# Patient Record
Sex: FEMALE | Race: WHITE | NOT HISPANIC OR LATINO | URBAN - METROPOLITAN AREA
[De-identification: names, ages, dates, MRNs, and addresses within clinical notes are randomized per-mention and may not be internally consistent; named-entity substitution may affect disease eponyms.]

---

## 2019-10-04 ENCOUNTER — EMERGENCY (EMERGENCY)
Facility: HOSPITAL | Age: 39
LOS: 1 days | Discharge: HOME | End: 2019-10-04
Attending: EMERGENCY MEDICINE
Payer: COMMERCIAL

## 2019-10-04 VITALS
TEMPERATURE: 98 F | RESPIRATION RATE: 17 BRPM | OXYGEN SATURATION: 96 % | WEIGHT: 194.01 LBS | SYSTOLIC BLOOD PRESSURE: 124 MMHG | HEART RATE: 72 BPM | DIASTOLIC BLOOD PRESSURE: 88 MMHG

## 2019-10-04 PROCEDURE — 73700 CT LOWER EXTREMITY W/O DYE: CPT | Mod: 26,RT

## 2019-10-04 PROCEDURE — 73700 CT LOWER EXTREMITY W/O DYE: CPT

## 2019-10-04 PROCEDURE — 73610 X-RAY EXAM OF ANKLE: CPT

## 2019-10-04 PROCEDURE — 99284 EMERGENCY DEPT VISIT MOD MDM: CPT

## 2019-10-04 PROCEDURE — 73600 X-RAY EXAM OF ANKLE: CPT

## 2019-10-04 PROCEDURE — 99285 EMERGENCY DEPT VISIT HI MDM: CPT | Mod: 25

## 2019-10-04 PROCEDURE — 73590 X-RAY EXAM OF LOWER LEG: CPT | Mod: 26,RT

## 2019-10-04 PROCEDURE — 76377 3D RENDER W/INTRP POSTPROCES: CPT

## 2019-10-04 PROCEDURE — 76377 3D RENDER W/INTRP POSTPROCES: CPT | Mod: 26

## 2019-10-04 PROCEDURE — 73590 X-RAY EXAM OF LOWER LEG: CPT

## 2019-10-04 PROCEDURE — 27762 CLTX MED ANKLE FX W/MNPJ: CPT

## 2019-10-04 PROCEDURE — 73610 X-RAY EXAM OF ANKLE: CPT | Mod: 26,RT

## 2019-10-04 NOTE — ED PROVIDER NOTE - CLINICAL SUMMARY MEDICAL DECISION MAKING FREE TEXT BOX
R proximal fib and ankle fracture s/p fall. NV intact. In splint already, will keep in splint until ortho eval. Consult ortho. Re fall last week, sx c/w post-concussive sx, improved, neuro exam wnl, extremely low suspicion of bleed or other sig intracranial traumatic injury. Pt does not want analgesia at this time. Re-eval. - Harvey Underwood MD

## 2019-10-04 NOTE — ED PROVIDER NOTE - CARE PLAN
Principal Discharge DX:	Closed fracture of proximal end of right fibula, unspecified fracture morphology, initial encounter

## 2019-10-04 NOTE — ED ADULT NURSE NOTE - OBJECTIVE STATEMENT
38 yr old fell off a tree and landed on her ankle. on assessment a and o x 3 lungs clear abd soft non tender no swelling in extremities no n/v/d no9 fevers no other complaints or medical issues. leg was wrapped at urgent care .

## 2019-10-04 NOTE — ED PROVIDER NOTE - NSFOLLOWUPINSTRUCTIONS_ED_ALL_ED_FT
Please follow up with the orthopedic surgeon, Dr. Askew, on Monday. Please call first thing tomorrow morning for an appointment.   Please use crutches. Please follow up with Dr. Askew [phone] within 5 days. Dr. Askew has an available appointment this Monday, 10/7/19. Please call first thing tomorrow morning to make an appointment.   Please take Tylenol and Motrin as needed for pain.  Please do not bear weight on your right leg. Crutches were provided for you.  Please return to the emergency department should you develop worsening symptoms, including but not limited to worsening of your pain, numbness, weakness, fever, chest pain and/or difficulty breathing.

## 2019-10-04 NOTE — ED PROVIDER NOTE - NS ED ROS FT
Constitutional: No fever or chills  Eyes: No visual changes  HEENT: No throat pain  CV: No chest pain  Resp: No SOB no cough  GI: No abd pain, nausea or vomiting  : No dysuria  MSK: RLE ankle pain  Skin: No rash  Neuro: No headache

## 2019-10-04 NOTE — CONSULT NOTE ADULT - SUBJECTIVE AND OBJECTIVE BOX
38y Female community ambulator presents c/o R ankle pain s/p fall. Pt is a community ambulatory at baseline. Pt denies numbness tingling paresthesias in affected limb. Pt denies headstrike or LOC and denies any other orthopedic injuries at this time.     Pt was playing with children, was swinging on a rope swing, lost  and fell onto R ankle. Unable to ambulate on after injury 2/2 pain. Seen at urgent care, who obtained X-rays, noted a proximal fibula and medial mal fracture, and sent to ED for further eval and treatment.   PAST MEDICAL & SURGICAL HISTORY:    MEDICATIONS  (STANDING):    Allergies    No Known Allergies    Intolerances    Vital Signs Last 24 Hrs  T(C): 36.9 (10-04-19 @ 16:52), Max: 36.9 (10-04-19 @ 16:52)  T(F): 98.4 (10-04-19 @ 16:52), Max: 98.4 (10-04-19 @ 16:52)  HR: 72 (10-04-19 @ 16:52) (72 - 72)  BP: 124/88 (10-04-19 @ 16:52) (124/88 - 124/88)  BP(mean): --  RR: 17 (10-04-19 @ 16:52) (17 - 17)  SpO2: 96% (10-04-19 @ 16:52) (96% - 96%)    Imaging: XR shows right medial mal fracture and proximal fibula    Physical Exam  Gen: NAD, AAOx3  RLE: Skin intact, +swelling at ankle, +TTP over medial malleoli and proximal fibula, no bony TTP at medial mal/Knee/Foot/Toes, able to SLR, neg logroll, +EHL/FHL/TA/GS, SILT L3-S1, +DP/PT Pulses, compartments soft, no calf TTP B/L    Secondary Survey: Full ROM of unaffected extremities, SILT globally, compartments soft, no bony TTP over bony prominences, no calf TTP, able to SLR with contralateral leg, no TTP along axial spine    Procedure: Reduction performed. Pt placed in well padded trilam splint. NVI postt splint.    A/P: 38y Female with R Maisonneuve type Fracture with medial mal  -pain control  -keep splint clean dry intact  -rest ice elevate affected ankle  -NWB on affected ankle  -NVI post splint  -Post reduction XR reveal adequate reduction  -discussed signs symptoms of compartment syndrome  -discussed need for surgical fixation  -please follow up in office within 5 days of DC from ED with Dr. Askew [phone] (available on 10/7 for appointment)  -ortho stable for DC 38y Female community ambulator presents c/o R ankle pain s/p fall. Pt is a community ambulatory at baseline. Pt denies numbness tingling paresthesias in affected limb. Pt denies headstrike or LOC and denies any other orthopedic injuries at this time.     Pt was playing with children, was swinging on a rope swing, lost  and fell onto R ankle. Unable to ambulate on after injury 2/2 pain. Seen at urgent care, who obtained X-rays, noted a proximal fibula and medial mal fracture, and sent to ED for further eval and treatment.   PAST MEDICAL & SURGICAL HISTORY:    MEDICATIONS  (STANDING):    Allergies    No Known Allergies    Intolerances    Vital Signs Last 24 Hrs  T(C): 36.9 (10-04-19 @ 16:52), Max: 36.9 (10-04-19 @ 16:52)  T(F): 98.4 (10-04-19 @ 16:52), Max: 98.4 (10-04-19 @ 16:52)  HR: 72 (10-04-19 @ 16:52) (72 - 72)  BP: 124/88 (10-04-19 @ 16:52) (124/88 - 124/88)  BP(mean): --  RR: 17 (10-04-19 @ 16:52) (17 - 17)  SpO2: 96% (10-04-19 @ 16:52) (96% - 96%)    Imaging: XR shows right medial mal fracture and proximal fibula    Physical Exam  Gen: NAD, AAOx3  RLE: Skin intact, +swelling at ankle, +TTP over medial malleoli and proximal fibula, no bony TTP at medial mal/Knee/Foot/Toes, able to SLR, neg logroll, +EHL/FHL/TA/GS, SILT L3-S1, +DP/PT Pulses, compartments soft, no calf TTP B/L    Secondary Survey: Full ROM of unaffected extremities, SILT globally, compartments soft, no bony TTP over bony prominences, no calf TTP, able to SLR with contralateral leg, no TTP along axial spine    Procedure: Reduction performed. Pt placed in well padded trilam splint. NVI postt splint.    A/P: 38y Female with R Maisonneuve type Fracture with medial mal  - obtain non-con CT right ankle  -pain control  -keep splint clean dry intact  -rest ice elevate affected ankle  -NWB on affected ankle  -NVI post splint  -Post reduction XR reveal adequate reduction  -discussed signs symptoms of compartment syndrome  -discussed need for surgical fixation  -please follow up in office within 5 days of DC from ED with Dr. Askew [phone] (available on 10/7 for appointment)  -ortho stable for DC

## 2019-10-04 NOTE — ED PROVIDER NOTE - PATIENT PORTAL LINK FT
You can access the FollowMyHealth Patient Portal offered by [facility] by registering at the following website: [email or web address]. By joining [employer]’s FollowMyHealth portal, you will also be able to view your health information using other applications (apps) compatible with our system.

## 2019-10-04 NOTE — ED PROVIDER NOTE - PHYSICAL EXAMINATION
PE by attending Yasmine    CN II-XII intact. Visual fields intact. EOMI, PERRLA. Facial sensation equal bilat. Smile and eye closure equal bilat. Hearing intact bilat. Palate elevation equal, tongue protrusion midline. Lateral head rotation equal bilat. 5/5 strength UE and LE bilat. Sensation grossly intact.

## 2019-10-04 NOTE — ED PROVIDER NOTE - MUSCULOSKELETAL, MLM
RLE in splint. Cap refill <2 sec all digits R foot. Able to move all digits R foot. Sensation intact.

## 2019-10-04 NOTE — ED PROVIDER NOTE - PROGRESS NOTE DETAILS
Pt splinted by ortho, who obtained post reduction films, re-evaluated pt, and has cleared her for DC. She is to f/u with ortho on Monday. - Harvey Underwood MD

## 2019-10-04 NOTE — ED PROVIDER NOTE - OBJECTIVE STATEMENT
Attending Yasmine: 38F, no sig pmh, presents with R ankle pain s/p fall. Pt was playing with children, was swinging on a rope swing, lost  and fell onto R ankle. +Pain, swelling. Unable to ambulate on after injury 2/2 pain. Seen at urgent care, who obtained X-rays, noted a proximal fibula and medial mal fracture, and sent to ED for further eval and treatment. Pt denies numbness, weakness. Of note, last week pt did have a slip and fall while ice skating strikign head, no LOC, did have subsequent dizziness, nausea (no vomiting), headache, these sx have since resolved. Also "saw stars" immediately after, this occurred again while getting into ambulance today. No neck pain, no numbness, weakness. No other injuries sustained.

## 2019-10-04 NOTE — ED PROVIDER NOTE - CARE PROVIDER_API CALL
Destiny Askew (MD)  Orthopaedic Surgery  [address] [place] [address], [address]  [place] [address]  Phone: [phone]  Fax: [phone]  Follow Up Time:

## 2020-03-05 ENCOUNTER — EMERGENCY (EMERGENCY)
Facility: HOSPITAL | Age: 40
LOS: 1 days | Discharge: HOME | End: 2020-03-05
Attending: EMERGENCY MEDICINE
Payer: COMMERCIAL

## 2020-03-05 VITALS
OXYGEN SATURATION: 98 % | HEART RATE: 84 BPM | SYSTOLIC BLOOD PRESSURE: 117 MMHG | RESPIRATION RATE: 18 BRPM | DIASTOLIC BLOOD PRESSURE: 78 MMHG | TEMPERATURE: 98 F

## 2020-03-05 VITALS
DIASTOLIC BLOOD PRESSURE: 73 MMHG | OXYGEN SATURATION: 98 % | RESPIRATION RATE: 17 BRPM | TEMPERATURE: 98 F | HEIGHT: 64 IN | WEIGHT: 192.02 LBS | SYSTOLIC BLOOD PRESSURE: 110 MMHG | HEART RATE: 91 BPM

## 2020-03-05 DIAGNOSIS — Z98.890 OTHER SPECIFIED POSTPROCEDURAL STATES: Chronic | ICD-10-CM

## 2020-03-05 LAB
ALBUMIN SERPL ELPH-MCNC: 4 G/DL — SIGNIFICANT CHANGE UP (ref 3.3–5)
ALP SERPL-CCNC: 82 U/L — SIGNIFICANT CHANGE UP (ref 40–120)
ALT FLD-CCNC: 29 U/L — SIGNIFICANT CHANGE UP (ref 10–45)
ANION GAP SERPL CALC-SCNC: 16 MMOL/L — SIGNIFICANT CHANGE UP (ref 5–17)
APPEARANCE UR: ABNORMAL
AST SERPL-CCNC: 30 U/L — SIGNIFICANT CHANGE UP (ref 10–40)
BACTERIA # UR AUTO: ABNORMAL
BASOPHILS # BLD AUTO: 0.02 K/UL — SIGNIFICANT CHANGE UP (ref 0–0.2)
BASOPHILS NFR BLD AUTO: 0.2 % — SIGNIFICANT CHANGE UP (ref 0–2)
BILIRUB SERPL-MCNC: 1.5 MG/DL — HIGH (ref 0.2–1.2)
BILIRUB UR-MCNC: NEGATIVE — SIGNIFICANT CHANGE UP
BUN SERPL-MCNC: 6 MG/DL — LOW (ref 7–23)
CALCIUM SERPL-MCNC: 9.1 MG/DL — SIGNIFICANT CHANGE UP (ref 8.4–10.5)
CHLORIDE SERPL-SCNC: 96 MMOL/L — SIGNIFICANT CHANGE UP (ref 96–108)
CO2 SERPL-SCNC: 23 MMOL/L — SIGNIFICANT CHANGE UP (ref 22–31)
COLOR SPEC: ABNORMAL
CREAT SERPL-MCNC: 0.65 MG/DL — SIGNIFICANT CHANGE UP (ref 0.5–1.3)
DIFF PNL FLD: NEGATIVE — SIGNIFICANT CHANGE UP
EOSINOPHIL # BLD AUTO: 0.09 K/UL — SIGNIFICANT CHANGE UP (ref 0–0.5)
EOSINOPHIL NFR BLD AUTO: 1 % — SIGNIFICANT CHANGE UP (ref 0–6)
EPI CELLS # UR: 14 /HPF — HIGH
GLUCOSE SERPL-MCNC: 90 MG/DL — SIGNIFICANT CHANGE UP (ref 70–99)
GLUCOSE UR QL: NEGATIVE — SIGNIFICANT CHANGE UP
HCT VFR BLD CALC: 34.8 % — SIGNIFICANT CHANGE UP (ref 34.5–45)
HGB BLD-MCNC: 11.5 G/DL — SIGNIFICANT CHANGE UP (ref 11.5–15.5)
HYALINE CASTS # UR AUTO: 4 /LPF — HIGH (ref 0–2)
IMM GRANULOCYTES NFR BLD AUTO: 0.2 % — SIGNIFICANT CHANGE UP (ref 0–1.5)
KETONES UR-MCNC: NEGATIVE — SIGNIFICANT CHANGE UP
LEUKOCYTE ESTERASE UR-ACNC: ABNORMAL
LIDOCAIN IGE QN: 17 U/L — SIGNIFICANT CHANGE UP (ref 7–60)
LYMPHOCYTES # BLD AUTO: 1.85 K/UL — SIGNIFICANT CHANGE UP (ref 1–3.3)
LYMPHOCYTES # BLD AUTO: 20.2 % — SIGNIFICANT CHANGE UP (ref 13–44)
MCHC RBC-ENTMCNC: 28.1 PG — SIGNIFICANT CHANGE UP (ref 27–34)
MCHC RBC-ENTMCNC: 33 GM/DL — SIGNIFICANT CHANGE UP (ref 32–36)
MCV RBC AUTO: 85.1 FL — SIGNIFICANT CHANGE UP (ref 80–100)
MONOCYTES # BLD AUTO: 0.74 K/UL — SIGNIFICANT CHANGE UP (ref 0–0.9)
MONOCYTES NFR BLD AUTO: 8.1 % — SIGNIFICANT CHANGE UP (ref 2–14)
NEUTROPHILS # BLD AUTO: 6.46 K/UL — SIGNIFICANT CHANGE UP (ref 1.8–7.4)
NEUTROPHILS NFR BLD AUTO: 70.3 % — SIGNIFICANT CHANGE UP (ref 43–77)
NITRITE UR-MCNC: NEGATIVE — SIGNIFICANT CHANGE UP
NRBC # BLD: 0 /100 WBCS — SIGNIFICANT CHANGE UP (ref 0–0)
PH UR: 6 — SIGNIFICANT CHANGE UP (ref 5–8)
PLATELET # BLD AUTO: 306 K/UL — SIGNIFICANT CHANGE UP (ref 150–400)
POTASSIUM SERPL-MCNC: 4.8 MMOL/L — SIGNIFICANT CHANGE UP (ref 3.5–5.3)
POTASSIUM SERPL-SCNC: 4.8 MMOL/L — SIGNIFICANT CHANGE UP (ref 3.5–5.3)
PROT SERPL-MCNC: 8 G/DL — SIGNIFICANT CHANGE UP (ref 6–8.3)
PROT UR-MCNC: ABNORMAL
RBC # BLD: 4.09 M/UL — SIGNIFICANT CHANGE UP (ref 3.8–5.2)
RBC # FLD: 12.1 % — SIGNIFICANT CHANGE UP (ref 10.3–14.5)
RBC CASTS # UR COMP ASSIST: 3 /HPF — SIGNIFICANT CHANGE UP (ref 0–4)
SODIUM SERPL-SCNC: 135 MMOL/L — SIGNIFICANT CHANGE UP (ref 135–145)
SP GR SPEC: 1.02 — SIGNIFICANT CHANGE UP (ref 1.01–1.02)
UROBILINOGEN FLD QL: NEGATIVE — SIGNIFICANT CHANGE UP
WBC # BLD: 9.18 K/UL — SIGNIFICANT CHANGE UP (ref 3.8–10.5)
WBC # FLD AUTO: 9.18 K/UL — SIGNIFICANT CHANGE UP (ref 3.8–10.5)
WBC UR QL: 11 /HPF — HIGH (ref 0–5)

## 2020-03-05 PROCEDURE — 99284 EMERGENCY DEPT VISIT MOD MDM: CPT

## 2020-03-05 PROCEDURE — 74177 CT ABD & PELVIS W/CONTRAST: CPT | Mod: 26

## 2020-03-05 PROCEDURE — 74177 CT ABD & PELVIS W/CONTRAST: CPT

## 2020-03-05 PROCEDURE — 81001 URINALYSIS AUTO W/SCOPE: CPT

## 2020-03-05 PROCEDURE — 83690 ASSAY OF LIPASE: CPT

## 2020-03-05 PROCEDURE — 99284 EMERGENCY DEPT VISIT MOD MDM: CPT | Mod: 25

## 2020-03-05 PROCEDURE — 87086 URINE CULTURE/COLONY COUNT: CPT

## 2020-03-05 PROCEDURE — 80053 COMPREHEN METABOLIC PANEL: CPT

## 2020-03-05 PROCEDURE — 85027 COMPLETE CBC AUTOMATED: CPT

## 2020-03-05 RX ORDER — MOXIFLOXACIN HYDROCHLORIDE TABLETS, 400 MG 400 MG/1
1 TABLET, FILM COATED ORAL
Qty: 20 | Refills: 0
Start: 2020-03-05 | End: 2020-03-14

## 2020-03-05 RX ORDER — METRONIDAZOLE 500 MG
500 TABLET ORAL ONCE
Refills: 0 | Status: COMPLETED | OUTPATIENT
Start: 2020-03-05 | End: 2020-03-05

## 2020-03-05 RX ORDER — METRONIDAZOLE 500 MG
1 TABLET ORAL
Qty: 30 | Refills: 0
Start: 2020-03-05 | End: 2020-03-14

## 2020-03-05 RX ORDER — CIPROFLOXACIN LACTATE 400MG/40ML
500 VIAL (ML) INTRAVENOUS ONCE
Refills: 0 | Status: COMPLETED | OUTPATIENT
Start: 2020-03-05 | End: 2020-03-05

## 2020-03-05 RX ADMIN — Medication 500 MILLIGRAM(S): at 17:04

## 2020-03-05 NOTE — ED ADULT NURSE NOTE - NS ED NURSE IV DC DT
Tremfya Pregnancy And Lactation Text: The risk during pregnancy and breastfeeding is uncertain with this medication. 05-Mar-2020 17:30

## 2020-03-05 NOTE — ED PROVIDER NOTE - PHYSICAL EXAMINATION
Physical Exam:  Gen: NAD, AOx3, non-toxic appearing, able to ambulate without assistance  Lung: CTAB, no respiratory distress, no wheezes/rhonchi/rales B/L, speaking in full sentences  CV: RRR, no murmurs, rubs or gallops, distal pulses 2+ b/l  Abd: LLQ TTP, pain in LLQ when palpating entire abdomen, soft, ND, no guarding, no rigidity, no rebound tenderness, no CVA tenderness   : chaperoned by Tita IRIZARRY, minimal physiological discharge, superificial erythema of posterior cervix w/o mass, no blood, no CMT or adnexal TTP  Skin: Warm, well perfused, no rash, no leg swelling  Psych: normal affect, calm

## 2020-03-05 NOTE — ED PROVIDER NOTE - ATTENDING CONTRIBUTION TO CARE
pt is a healthy 40 y/o s/p ovarian cyst removal on right side with l sided abd pain the past 3 days now with no gi sts, finished her period 3 days ago with nl pelvic exam, but pelvic us ordered r/o torsion, ct ordered for l sided abd pain, labs, deferred analgesia. abdominal work up.

## 2020-03-05 NOTE — ED PROVIDER NOTE - PATIENT PORTAL LINK FT
You can access the FollowMyHealth Patient Portal offered by [facility] by registering at the following website: [email or web address]. By joining [employer]’s FollowMyHealth portal, you will also be able to view your health information using other applications (apps) compatible with our system.

## 2020-03-05 NOTE — ED ADULT NURSE REASSESSMENT NOTE - NS ED NURSE REASSESS COMMENT FT1
Pt found to have diverticulitis. Antibiotics administered and pt cleared for d/c by MD Eric. D/c instructions reviewed with pt for follow up with gastroenterology and OB/GYN, and follow full antibiotic course. VSS, IV removed, pt verbalized d/c instructions.

## 2020-03-05 NOTE — ED PROVIDER NOTE - NS ED ROS FT
ROS:  GENERAL: No fever, no chills  CARDIAC: no chest pain  PULMONARY: no cough, no dyspnea  GI: abdominal pain, loose stools, no nausea, no vomiting, no constipation  : No dysuria, hematuria or frequency  SKIN: no rashes

## 2020-03-05 NOTE — ED ADULT NURSE NOTE - OBJECTIVE STATEMENT
39 year old female presenting to ED c/o abdominal pain. PMH includes PCOS, ovarian cyst surgery, anal fissure. Pt A+Ox3 reports intermittent 4/10 LLQ abdominal pain x3 days with loose stools, nausea, headache yesterday. Pt also reports using pillows to prop left side up and lay on right side in order to sleep. Pt reports positioning alleviates the pain, movement and palpation exacerbates it, and denies taking medication for the pain. LMP 2/28. Pt denies dizziness, chest pain, SOB, vomiting, urinary symptoms, constipation, recent travel.    Pt presents with radiating abdominal tenderness.

## 2020-03-05 NOTE — ED PROVIDER NOTE - OBJECTIVE STATEMENT
40yo female h/o PCOS, remote laparoscopic R ovarian cystectomy p/w dull LLQ w/ intermittent worsening x3 days, woke from sleep 2 nights ago. Notes loose stools x2 today. Has not trialed any pain meds at home. Denies N/V, fevers, similar pain in the past, h/o constipation, vaginal bleeding, pelvic pain, recent travel. Sexually active with 1 long term partner, no h/o STD's. LMP 3 days ago. Last OBGYN visit and pap smear 4-5 years ago. patient would not like any pain meds or IV fluids at this time.

## 2020-03-05 NOTE — ED PROVIDER NOTE - PROGRESS NOTE DETAILS
Eric PGY1: discussed cervical findings with patient, aware needs to follow up with OBGYN as due for pap smear. Will provide number upon discharge. Eric PGY1: discussed CT findings with radiology, given  exam and alternative diagnosis for pain, suspicion for torsion very unlikely, no US needed Eric PGY1: Patient reevaluated and feeling better. VSS. Reviewed and discussed results with patient. Discussed importance of follow up and return precautions. Patient agrees with plan.

## 2020-03-05 NOTE — ED PROVIDER NOTE - NSFOLLOWUPCLINICS_GEN_ALL_ED_FT
[facility]  Gastroenterology  [address], [address]  [place] [address]  Phone: [phone]  Fax:     [facility] Gynecology and Obstetrics  Gynceology/OB  [address] [place] [address]  Phone: [phone]  Fax:   Follow Up Time:

## 2020-03-05 NOTE — ED PROVIDER NOTE - NSFOLLOWUPINSTRUCTIONS_ED_ALL_ED_FT
- Continue all regular medications  - Take antibiotics as prescribed, do not stop until you finish all the antibiotics even if you feel better  - For pain, take tylenol as directed on the packaging  - Follow up with gastroenterology next week for colonoscopy, call the number above if you do not hear from gastroenterology by Monday  - Call the number above to follow up with OBGYN concerning for the minor isolated redness on your cervix and you are due for a pap smear  - You were given copies of labs and/or imaging results if applicable, please take them to your follow up appointments  - Return to the ER for constant vomiting, worsening abdominal pain despite pain medications or any worsening symptoms or concerns

## 2020-03-06 LAB
CULTURE RESULTS: SIGNIFICANT CHANGE UP
SPECIMEN SOURCE: SIGNIFICANT CHANGE UP

## 2022-09-26 NOTE — ED PROVIDER NOTE - PROGRESS NOTE ADDITIONAL1
Quality 226: Preventive Care And Screening: Tobacco Use: Screening And Cessation Intervention: Patient screened for tobacco use and is an ex/non-smoker
Detail Level: Detailed
Quality 130: Documentation Of Current Medications In The Medical Record: Current Medications Documented
Quality 431: Preventive Care And Screening: Unhealthy Alcohol Use - Screening: Patient not identified as an unhealthy alcohol user when screened for unhealthy alcohol use using a systematic screening method
Additional Progress Note...

## 2024-01-15 NOTE — ED PROVIDER NOTE - DISPOSITION TYPE
Diarrhea from Augmentin is not an allergy.  Recommend probiotic such as align for about 2 to 4 weeks.  Today your exam is normal.  I would recommend not taking another antibiotic unless her symptoms return in which I would recommend doxycycline    DISCHARGE